# Patient Record
Sex: FEMALE | URBAN - METROPOLITAN AREA
[De-identification: names, ages, dates, MRNs, and addresses within clinical notes are randomized per-mention and may not be internally consistent; named-entity substitution may affect disease eponyms.]

---

## 2021-07-08 ENCOUNTER — APPOINTMENT (OUTPATIENT)
Dept: RADIOLOGY | Facility: CLINIC | Age: 56
End: 2021-07-08
Payer: COMMERCIAL

## 2021-07-08 ENCOUNTER — OFFICE VISIT (OUTPATIENT)
Dept: URGENT CARE | Facility: CLINIC | Age: 56
End: 2021-07-08
Payer: COMMERCIAL

## 2021-07-08 VITALS
OXYGEN SATURATION: 99 % | BODY MASS INDEX: 31.08 KG/M2 | HEIGHT: 61 IN | TEMPERATURE: 97.5 F | DIASTOLIC BLOOD PRESSURE: 80 MMHG | RESPIRATION RATE: 18 BRPM | HEART RATE: 99 BPM | SYSTOLIC BLOOD PRESSURE: 126 MMHG | WEIGHT: 164.6 LBS

## 2021-07-08 DIAGNOSIS — M79.89 SWELLING OF LEFT HAND: ICD-10-CM

## 2021-07-08 DIAGNOSIS — L03.114 CELLULITIS OF LEFT HAND: Primary | ICD-10-CM

## 2021-07-08 PROCEDURE — 99203 OFFICE O/P NEW LOW 30 MIN: CPT | Performed by: PHYSICIAN ASSISTANT

## 2021-07-08 PROCEDURE — 73130 X-RAY EXAM OF HAND: CPT

## 2021-07-08 RX ORDER — CLINDAMYCIN PHOSPHATE 11.9 MG/ML
SOLUTION TOPICAL
COMMUNITY
Start: 2021-05-03

## 2021-07-08 RX ORDER — SULFAMETHOXAZOLE AND TRIMETHOPRIM 800; 160 MG/1; MG/1
1 TABLET ORAL EVERY 12 HOURS SCHEDULED
Qty: 14 TABLET | Refills: 0 | Status: SHIPPED | OUTPATIENT
Start: 2021-07-08 | End: 2021-07-15

## 2021-07-08 RX ORDER — NIFEDIPINE 90 MG/1
90 TABLET, EXTENDED RELEASE ORAL DAILY
COMMUNITY
Start: 2021-05-06

## 2021-07-08 NOTE — PROGRESS NOTES
[facility]        NAME: Joselito Hernandez is a 64 y o  female  : 1965    MRN: [id number]  DATE: 2021  TIME: 1:30 PM    Assessment and Plan   Cellulitis of left hand [L03 114]  1  Cellulitis of left hand  sulfamethoxazole-trimethoprim (BACTRIM DS) 800-160 mg per tablet   2  Swelling of left hand  XR hand 3+ vw left    sulfamethoxazole-trimethoprim (BACTRIM DS) 800-160 mg per tablet         Patient Instructions   Cellulitis of the left hand:   -The Xray of the left hand shows no obvious fracture or dislocation  There is no sign of osteomyelitis  We discussed that this is likely a soft tissue infection likely due to an insect bite or break in the skin    -Will start the patient on Bactrim DS twice daily for seven days  Take with food and a probiotic    -You can take Advil or Tylenol for pain  -Ice the hand and elevate  -Rest and avoid strenuous activity  You should stay well hydrated  -Follow up with Maggi Monterroso is advised  If you experience worsening redness, swelling, pain, fever, chills or worsening decreased ROM you should go to the ED immediately  Follow up with PCP in 3-5 days  Proceed to  ER if symptoms worsen  Chief Complaint     Chief Complaint   Patient presents with    Hand Swelling     Pt here for left and swelling x4 days pt states  no injury maybe a bug bite, area is warm and swollen, pt used Aspirin and  Tylenol  History of Present Illness       The patient is a 51-year-old female who presents today for edema of the L hand x 4 days  The patient states that while at a firework show four days ago she began to experience pain of the L fourth MCP joint  She states that she then began to experience redness, swelling and decreased ROM of the L 2nd, 3rd and 4th MCP joints  The patient states that there was no trauma or injury but she feels that she may have gotten an insect bite while at the firework show  She states that the hand is now warm to touch   She Standard)   Pulse 99   Temp 97 5 °F (36 4 °C) (Tympanic)   Resp 18   Ht 5' 1" (1 549 m)   Wt 74 7 kg (164 lb 9 6 oz)   SpO2 99%   BMI 31 10 kg/m²   No LMP recorded  Physical Exam     Physical Exam  Vitals and nursing note reviewed  Constitutional:       General: She is not in acute distress  Appearance: She is well-developed  She is not diaphoretic  Cardiovascular:      Rate and Rhythm: Normal rate and regular rhythm  Heart sounds: Normal heart sounds  Pulmonary:      Effort: Pulmonary effort is normal       Breath sounds: Normal breath sounds  Musculoskeletal:      Left hand: Swelling and tenderness present  No lacerations or bony tenderness  Decreased range of motion  Normal strength  Normal sensation  There is no disruption of two-point discrimination  Normal capillary refill  Normal pulse  Comments: There is erythema, edema and warmth to touch over the left 2nd, 3rd and 4th MCP joints  The patient has mild tenderness over the 4th MCP joint  There is decreased ROM over the affected digits  There is no break in the skin  No overlying skin abnormalities  There is no drainage  Capillary refill unable to be assessed as the patient has acrylic nails on  Sensation is intact  Strength is intact  Skin:     General: Skin is warm and dry  Findings: Erythema present  No abrasion, abscess, bruising, ecchymosis, signs of injury, lesion, rash or wound  Neurological:      General: No focal deficit present  Mental Status: She is alert and oriented to person, place, and time  Sensory: Sensation is intact  No sensory deficit  Motor: Motor function is intact  No weakness or abnormal muscle tone        Gait: Gait normal    Psychiatric:         Behavior: Behavior normal

## 2021-07-08 NOTE — PATIENT INSTRUCTIONS
Cellulitis   WHAT YOU NEED TO KNOW:   Cellulitis is a skin infection caused by bacteria  Cellulitis may go away on its own or you may need treatment  Your healthcare provider may draw a [place] around the outside edges of your cellulitis  If your cellulitis spreads, your healthcare provider will see it outside of the [place]  DISCHARGE INSTRUCTIONS:   Call 911 if:   · You have sudden trouble breathing or chest pain  Return to the emergency department if:   · Your wound gets larger and more painful  · You feel a crackling under your skin when you touch it  · You have purple dots or bumps on your skin, or you see bleeding under your skin  · You have new swelling and pain in your legs  · The red, warm, swollen area gets larger  · You see red streaks coming from the infected area  Contact your healthcare provider if:   · You have a fever  · Your fever or pain does not go away or gets worse  · The area does not get smaller after 2 days of antibiotics  · Your skin is flaking or peeling off  · You have questions or concerns about your condition or care  Medicines:   · Antibiotics  help treat the bacterial infection  · NSAIDs , such as ibuprofen, help decrease swelling, pain, and fever  NSAIDs can cause stomach bleeding or kidney problems in certain people  If you take blood thinner medicine, always ask if NSAIDs are safe for you  Always read the medicine label and follow directions  Do not give these medicines to children under 10months of age without direction from your child's healthcare provider  · Acetaminophen  decreases pain and fever  It is available without a doctor's order  Ask how much to take and how often to take it  Follow directions  Read the labels of all other medicines you are using to see if they also contain acetaminophen, or ask your doctor or pharmacist  Acetaminophen can cause liver damage if not taken correctly   Do not use more than 4 grams (4,000 milligrams) total of acetaminophen in one day  · Take your medicine as directed  Contact your healthcare provider if you think your medicine is not helping or if you have side effects  Tell him or her if you are allergic to any medicine  Keep a list of the medicines, vitamins, and herbs you take  Include the amounts, and when and why you take them  Bring the list or the pill bottles to follow-up visits  Carry your medicine list with you in case of an emergency  Self-care:   · Elevate the area above the level of your heart  as often as you can  This will help decrease swelling and pain  Prop the area on pillows or blankets to keep it elevated comfortably  · Clean the area daily until the wound scabs over  Gently wash the area with soap and water  Pat dry  Use dressings as directed  · Place cool or warm, wet cloths on the area as directed  Use clean cloths and clean water  Leave it on the area until the cloth is room temperature  Pat the area dry with a clean, dry cloth  The cloths may help decrease pain  Prevent cellulitis:   · Do not scratch bug bites or areas of injury  You increase your risk for cellulitis by scratching these areas  · Do not share personal items, such as towels, clothing, and razors  · Clean exercise equipment  with germ-killing  before and after you use it  · Wash your hands often  Use soap and water  Wash your hands after you use the bathroom, change a child's diapers, or sneeze  Wash your hands before you prepare or eat food  Use lotion to prevent dry, cracked skin  · Wear pressure stockings as directed  You may be told to wear the stockings if you have peripheral edema  The stockings improve blood flow and decrease swelling  · Treat athlete's foot  This can help prevent the spread of a bacterial skin infection  Follow up with your healthcare provider within 3 days, or as directed:   Your healthcare provider will check if your cellulitis is getting better  You may need different medicine  Write down your questions so you remember to ask them during your visits  © Copyright [address] Information is for End User's use only and may not be sold, redistributed or otherwise used for commercial purposes  All illustrations and images included in CareNotes® are the copyrighted property of [employer]  or Domitila Barron   The above information is an  only  It is not intended as medical advice for individual conditions or treatments  Talk to your doctor, nurse or pharmacist before following any medical regimen to see if it is safe and effective for you  Cellulitis of the left hand:   -The Xray of the left hand shows no obvious fracture or dislocation  There is no sign of osteomyelitis  We discussed that this is likely a soft tissue infection likely due to an insect bite or break in the skin    -Will start the patient on Bactrim DS twice daily for seven days  Take with food and a probiotic    -You can take Advil or Tylenol for pain  -Ice the hand and elevate  -Rest and avoid strenuous activity  You should stay well hydrated  -Follow up with Adriana Mcdonald is advised  If you experience worsening redness, swelling, pain, fever, chills or worsening decreased ROM you should go to the ED immediately